# Patient Record
(demographics unavailable — no encounter records)

---

## 2024-10-30 NOTE — PHYSICAL EXAM
[Bilateral] : knee bilaterally [] : lateral joint line tenderness [NL (0)] : extension 0 degrees [FreeTextEntry8] : R>L [TWNoteComboBox7] : flexion 130 degrees

## 2024-10-30 NOTE — WORK
[Partial] : partial [Does not reveal pre-existing condition(s) that may affect treatment/prognosis] : does not reveal pre-existing condition(s) that may affect treatment/prognosis [Can return to work without limitations on ______] : can return to work without limitations on [unfilled] [Unknown at this time] : : unknown at this time [Patient] : patient [I provided the services listed above] :  I provided the services listed above.

## 2024-10-30 NOTE — IMAGING
[Bilateral] : knee bilaterally [FreeTextEntry9] : not acute fractures; L osteochondroma lateral femur

## 2024-10-30 NOTE — ASSESSMENT
[FreeTextEntry1] : Bilateral knee contusions after fall at work. R>L. Continue ice, elevation. NSAIDs or tylenol prn. She is working. Follow up as needed.   I am working today under the supervision of Dr. Cuenca and I am following the plan of care of Dr. Cuenca as described by him on this date. Progress note completed by Kassidy Barrow PA-C under the supervision of Dr. Cuenca.

## 2024-10-30 NOTE — HISTORY OF PRESENT ILLNESS
[Work related] : work related [8] : 8 [7] : 7 [Dull/Aching] : dull/aching [Ice] : ice [Not working due to injury] : Work status: not working due to injury [de-identified] : WC 10/30/24 Principal's   10/30/24: 70 yo female with bilateral knee pain since 10/30/24. She reports tripping and falling while at work. She landed on both knees. There is pain with walking. She iced and elevated. No prior injuries.  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